# Patient Record
Sex: MALE | Race: WHITE | Employment: PART TIME | URBAN - METROPOLITAN AREA
[De-identification: names, ages, dates, MRNs, and addresses within clinical notes are randomized per-mention and may not be internally consistent; named-entity substitution may affect disease eponyms.]

---

## 2020-05-24 ENCOUNTER — APPOINTMENT (OUTPATIENT)
Dept: GENERAL RADIOLOGY | Facility: CLINIC | Age: 20
End: 2020-05-24
Attending: EMERGENCY MEDICINE
Payer: COMMERCIAL

## 2020-05-24 ENCOUNTER — HOSPITAL ENCOUNTER (EMERGENCY)
Facility: CLINIC | Age: 20
Discharge: HOME | End: 2020-05-24
Attending: EMERGENCY MEDICINE | Admitting: EMERGENCY MEDICINE
Payer: COMMERCIAL

## 2020-05-24 VITALS
BODY MASS INDEX: 30.66 KG/M2 | DIASTOLIC BLOOD PRESSURE: 108 MMHG | TEMPERATURE: 99.2 F | HEART RATE: 92 BPM | HEIGHT: 69 IN | OXYGEN SATURATION: 98 % | SYSTOLIC BLOOD PRESSURE: 171 MMHG | WEIGHT: 207 LBS | RESPIRATION RATE: 16 BRPM

## 2020-05-24 DIAGNOSIS — S41.151A DOG BITE OF RIGHT UPPER EXTREMITY, INITIAL ENCOUNTER: ICD-10-CM
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
## 2020-05-24 PROCEDURE — 25000132 ZZH RX MED GY IP 250 OP 250 PS 637: Performed by: EMERGENCY MEDICINE

## 2020-05-24 PROCEDURE — 90715 TDAP VACCINE 7 YRS/> IM: CPT | Performed by: EMERGENCY MEDICINE

## 2020-05-24 PROCEDURE — 73090 X-RAY EXAM OF FOREARM: CPT | Mod: RT

## 2020-05-24 PROCEDURE — 99284 EMERGENCY DEPT VISIT MOD MDM: CPT | Mod: Z6 | Performed by: EMERGENCY MEDICINE

## 2020-05-24 PROCEDURE — 25000128 H RX IP 250 OP 636: Performed by: EMERGENCY MEDICINE

## 2020-05-24 PROCEDURE — 99283 EMERGENCY DEPT VISIT LOW MDM: CPT | Performed by: EMERGENCY MEDICINE

## 2020-05-24 PROCEDURE — 90471 IMMUNIZATION ADMIN: CPT | Performed by: EMERGENCY MEDICINE

## 2020-05-24 RX ORDER — MAGNESIUM HYDROXIDE 1200 MG/15ML
LIQUID ORAL
Status: DISCONTINUED
Start: 2020-05-24 | End: 2020-05-24 | Stop reason: HOSPADM

## 2020-05-24 RX ORDER — IBUPROFEN 600 MG/1
600 TABLET, FILM COATED ORAL ONCE
Status: DISCONTINUED | OUTPATIENT
Start: 2020-05-24 | End: 2020-05-24 | Stop reason: HOSPADM

## 2020-05-24 RX ADMIN — CLOSTRIDIUM TETANI TOXOID ANTIGEN (FORMALDEHYDE INACTIVATED), CORYNEBACTERIUM DIPHTHERIAE TOXOID ANTIGEN (FORMALDEHYDE INACTIVATED), BORDETELLA PERTUSSIS TOXOID ANTIGEN (GLUTARALDEHYDE INACTIVATED), BORDETELLA PERTUSSIS FILAMENTOUS HEMAGGLUTININ ANTIGEN (FORMALDEHYDE INACTIVATED), BORDETELLA PERTUSSIS PERTACTIN ANTIGEN, AND BORDETELLA PERTUSSIS FIMBRIAE 2/3 ANTIGEN 0.5 ML: 5; 2; 2.5; 5; 3; 5 INJECTION, SUSPENSION INTRAMUSCULAR at 20:32

## 2020-05-24 RX ADMIN — AMOXICILLIN AND CLAVULANATE POTASSIUM 1 TABLET: 875; 125 TABLET, FILM COATED ORAL at 20:30

## 2020-05-24 ASSESSMENT — MIFFLIN-ST. JEOR: SCORE: 1929.33

## 2020-05-24 ASSESSMENT — ENCOUNTER SYMPTOMS
WOUND: 1
RHINORRHEA: 0
VOMITING: 0
COUGH: 0
SHORTNESS OF BREATH: 0
DIARRHEA: 0
FEVER: 0
NAUSEA: 0
ABDOMINAL PAIN: 0

## 2020-05-24 NOTE — ED AVS SNAPSHOT
[facility], [place], Emergency Department  [address] [address]  Phone:  [phone]                                    Danilo Azar   MRN: [id number]    Department:  [facility], Emergency Department   Date of Visit:  5/24/2020           After Visit Summary Signature Page    I have received my discharge instructions, and my questions have been answered. I have discussed any challenges I see with this plan with the nurse or doctor.    ..........................................................................................................................................  Patient/Patient Representative Signature      ..........................................................................................................................................  Patient Representative Print Name and Relationship to Patient    ..................................................               ................................................  Date                                   Time    ..........................................................................................................................................  Reviewed by Signature/Title    ...................................................              ..............................................  Date                                               Time          22EPIC Rev 08/18

## 2020-05-25 NOTE — ED PROVIDER NOTES
[place] EMERGENCY DEPARTMENT ([facility])  May 24, 2020    ED Provider Note  [facility]      History     Chief Complaint   Patient presents with     Dog Bite     HPI  Danilo Azar is a 20 year old right-hand-dominant male who presents to the ED for evaluation after being bitten by a dog around 6:20 PM this evening. Patient reports he stopped by his neighbor's house when he was walking home. He states they got a new dog (2-year-old Great Scar from a rescue), so he was on his knees petting the dog. He reports the dog got too excited and bit his right forearm. Patient denies any other injury. He denies pain in his right hand or fingers. He states he was having some right wrist pain, but that has resolved. He states his neighbors washed the wound and put some ointment on it. He reports his neighbors said the dog is up-to-date on vaccines. Patient denies fever, cough, congestion, runny nose, recent illness, chest pain, shortness of breath, abdominal pain, nausea, vomiting, or diarrhea.    Per [facility], patient's last tetanus shot was administered on 2/12/2009.    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    Past Medical History  Past Medical History:   Diagnosis Date     Allergies      Tinnitus      History reviewed. No pertinent surgical history.  amoxicillin-clavulanate (AUGMENTIN) 875-125 MG tablet      No Known Allergies  Past medical history, past surgical history, medications, and allergies were reviewed with the patient. Additional pertinent items: None    Family History  History reviewed. No pertinent family history.  Family history was reviewed with the patient. Additional pertinent items: None    Social History  Social History     Tobacco Use     Smoking status: Never Smoker     Smokeless tobacco: Never Used   Substance Use Topics     Alcohol use: Yes     Comment: occasionally     Drug use: Yes     Types: Marijuana      Social history  "was reviewed with the patient. Additional pertinent items: None    Review of Systems   Constitutional: Negative for fever.   HENT: Negative for congestion and rhinorrhea.    Respiratory: Negative for cough and shortness of breath.    Cardiovascular: Negative for chest pain.   Gastrointestinal: Negative for abdominal pain, diarrhea, nausea and vomiting.   Musculoskeletal:        Negative for R wrist, hand, or finger pain.   Skin: Positive for wound (dog bite to R forearm).     A complete review of systems was performed with pertinent positives and negatives noted in the HPI, and all other systems negative.    Physical Exam   BP: (!) 171/108  Heart Rate: 92  Temp: 99.2  F (37.3  C)  Resp: 16  Height: 175.3 cm (5' 9\")  Weight: 93.9 kg (207 lb)  SpO2: 98 %  Physical Exam  Vitals signs and nursing note reviewed.   Constitutional:       Appearance: He is well-developed.      Comments: Alert, cooperative, NAD   HENT:      Head: Normocephalic.   Eyes:      Pupils: Pupils are equal, round, and reactive to light.   Cardiovascular:      Rate and Rhythm: Normal rate and regular rhythm.      Heart sounds: Normal heart sounds. No murmur. No gallop.    Pulmonary:      Effort: Pulmonary effort is normal. No respiratory distress.      Breath sounds: Normal breath sounds. No wheezing or rales.   Abdominal:      General: Bowel sounds are normal. There is no distension.      Palpations: Abdomen is soft.      Tenderness: There is no abdominal tenderness. There is no guarding or rebound.   Musculoskeletal:      Comments: R forearm: volar portion of forearm has two very superficial abrasions and two deeper open wounds.  There is one wound on the more proximal and lateral aspect of the forearm which is open and oozing, approx 0.5 cm in length.  There is another wound along the medial and more distal portion of the forearm which is open and oozing, approx 2 cm in length with visible subcutaneous tissue. Distal CMS is intact.    Skin:     " General: Skin is warm and dry.   Neurological:      Mental Status: He is alert and oriented to person, place, and time.   Psychiatric:         Behavior: Behavior normal.         ED Course      Procedures                         Results for orders placed or performed during the hospital encounter of 05/24/20   XR Forearm Port Right 2 Views     Status: None    Narrative    EXAM: XR FOREARM PORT RT 2 VW  LOCATION: [facility]  DATE/TIME: 5/24/2020 8:10 PM    INDICATION: Dog bite. Evaluate for retained foreign body.  COMPARISON: None.      Impression    IMPRESSION: No visible fracture, dislocation or opaque foreign body. Soft tissue laceration.     Medications   sodium chloride 0.9% (bottle) 0.9 % irrigation (has no administration in time range)   ibuprofen (ADVIL/MOTRIN) tablet 600 mg (has no administration in time range)   Tdap (tetanus-diphtheria-acell pertussis) (ADACEL) injection 0.5 mL (0.5 mLs Intramuscular Given 5/24/20 2032)   amoxicillin-clavulanate (AUGMENTIN) 875-125 MG per tablet 1 tablet (1 tablet Oral Given 5/24/20 2030)        Assessments & Plan (with Medical Decision Making)   Patient is a right-hand-dominant male who presents to the emergency department after sustaining a dog bite to his right forearm.  The dog bite appears to be relatively large as the dog was a Great Scar.  There are 2 deeper puncture marks along the volar and medial forearm.  This is likely a provoked bite as this was a new pet of the patient's neighbors and he was attempting to pet the dog.  According to the neighbors, the dog's vaccinations including rabies vaccinations are up-to-date and they are able to provide paperwork.    I do not believe that the patient requires any rabies vaccine or immunoglobulin given the circumstances.  His tetanus is not up-to-date so we have updated this.  We did irrigate the wounds copiously.  I do not see any deeper injury at this time, though need to consider the potential for deeper  inoculation of the canine oral bacteria which could potentially cause infection at a later date.  Because of the concern for infection, I am reluctant to close this wound despite the fact that the size of it would potentially benefit from 1-2 sutures.  I did have a discussion with the patient where I explained the concern about closing a dog bite.  It is not in a cosmetically sensitive area. Ultimately we elected to place steri strips over the area and very loosely pull the edges together in order to still allow drainage/exit for bacteria.  The patient was advised that it is possible that he may go on to develop an infection at a later time.  He should be cognizant and on the look out for erythema around the site, purulent drainage, greatly increased pain, or fevers.  We did give him a prescription for Augmentin for him to take for approximately 1 week.  For pain he can use ice packs as well as ibuprofen.  He should come back to the emergency department if he notices any of the warning signs that we discussed with him.  Patient verbalizes understanding.    I have reviewed the nursing notes. I have reviewed the findings, diagnosis, plan and need for follow up with the patient.    New Prescriptions    AMOXICILLIN-CLAVULANATE (AUGMENTIN) 875-125 MG TABLET    Take 1 tablet by mouth 2 times daily for 7 days       Final diagnoses:   Dog bite of right upper extremity, initial encounter     I, Sabra Rachel, am serving as a trained medical scribe to document services personally performed by Paola Jason MD, based on the provider's statements to me.      I, Paola Jason MD, was physically present and have reviewed and verified the accuracy of this note documented by Sabra Rachel.     --  Paola Jason MD  Emergency Medicine   [facility], [place], EMERGENCY DEPARTMENT  5/24/2020     Paola Jason MD  05/24/20 3009

## 2020-05-25 NOTE — DISCHARGE INSTRUCTIONS
You have been seen in the emergency department today for a dog bite.  We have cleaned the area with saline.  Your x-ray does not show any sign of retained tooth or foreign body inside the arm.  Due to the risk for infection, we do not want to close the wound with sutures.  We have placed a Steri-Strip to help bring the wound edges together.  You can expect this to heal from the bottom up over time.  We have given you a prescription for antibiotics to try to prevent infection.  If you notice redness around the site, drainage from the wound that looks like pus, or fever, or greatly increased pain, come back to the emergency department for reevaluation.  We recommend that you use ice on the area tonight to help with pain and swelling.  You can also use ibuprofen.  We recommend that you keep the wound covered while you are at work, and let it air out while you are at home.  Follow-up with your clinic as needed.  We have updated your tetanus here in the emergency department so you should not need another one for 10 years.

## 2020-05-25 NOTE — ED TRIAGE NOTES
Pt. Presents to ED from home after being bit by a neighbors 3 y/o great filipe dog. Pt. Reports that the owners report dog is UTD on vaccines. Pt. Denies numbness/tingling in affected extremity, good color, motion. Pt. Denies other injuries and reports pain only @ the bite. Pt. A & O x 4, independent, hypertensive, OVSS on RA.